# Patient Record
Sex: FEMALE | URBAN - METROPOLITAN AREA
[De-identification: names, ages, dates, MRNs, and addresses within clinical notes are randomized per-mention and may not be internally consistent; named-entity substitution may affect disease eponyms.]

---

## 2021-08-07 ENCOUNTER — APPOINTMENT (RX ONLY)
Dept: URBAN - METROPOLITAN AREA CLINIC 55 | Facility: CLINIC | Age: 31
Setting detail: DERMATOLOGY
End: 2021-08-07

## 2021-08-07 DIAGNOSIS — Z41.9 ENCOUNTER FOR PROCEDURE FOR PURPOSES OTHER THAN REMEDYING HEALTH STATE, UNSPECIFIED: ICD-10-CM

## 2021-08-07 PROCEDURE — ? DYSPORT

## 2021-08-07 PROCEDURE — ? COSMETIC CONSULTATION: GENERAL

## 2021-08-07 NOTE — PROCEDURE: DYSPORT
Left Periorbital Skin Units: 0
Consent: Written consent obtained. Risks include but not limited to lid/brow ptosis, bruising, swelling, diplopia, temporary effect, incomplete chemical denervation.
Glabellar Complex Units: 45
Show Additional Area 1: Yes
Expiration Date (Month Year): 2/28/2022
Show Mentalis Units: No
Additional Area 1 Location: Right Lateral Brow
Additional Area 3 Location: Chin
Additional Area 2 Location: Upper Lip
Dilution (U/0.1 Cc): 10
Lot #: [id number]
Detail Level: Detailed
Forehead Units: 12.5
Post-Care Instructions: Patient instructed to not lie down for 4 hours and limit physical activity for 24 hours.

## 2022-03-05 ENCOUNTER — APPOINTMENT (RX ONLY)
Dept: URBAN - METROPOLITAN AREA CLINIC 55 | Facility: CLINIC | Age: 32
Setting detail: DERMATOLOGY
End: 2022-03-05

## 2022-03-05 DIAGNOSIS — Z41.9 ENCOUNTER FOR PROCEDURE FOR PURPOSES OTHER THAN REMEDYING HEALTH STATE, UNSPECIFIED: ICD-10-CM

## 2022-03-05 PROCEDURE — ? DYSPORT

## 2022-03-05 NOTE — PROCEDURE: DYSPORT
R Brow Units: 0
Additional Area 2 Location: Upper lip
Consent: Written consent obtained. Risks include but not limited to lid/brow ptosis, bruising, swelling, diplopia, temporary effect, incomplete chemical denervation.
Show Orbicularis Oculi Units: Yes
Show Right And Left Periorbital Units: No
Glabellar Complex Units: 45
Additional Area 4 Location: left lateral 
Dilution (U/0.1 Cc): 10
Lot #: [id number]
Forehead Units: 12.5
Additional Area 1 Location: Lateral Brows
Additional Area 5 Location: right lateral brow
Expiration Date (Month Year): 7/31/22
Additional Area 3 Location: Chin
Detail Level: Detailed
Post-Care Instructions: Patient instructed to not lie down for 4 hours and limit physical activity for 24 hours.

## 2023-04-12 ENCOUNTER — APPOINTMENT (RX ONLY)
Dept: URBAN - METROPOLITAN AREA CLINIC 55 | Facility: CLINIC | Age: 33
Setting detail: DERMATOLOGY
End: 2023-04-12

## 2023-04-12 DIAGNOSIS — Z41.9 ENCOUNTER FOR PROCEDURE FOR PURPOSES OTHER THAN REMEDYING HEALTH STATE, UNSPECIFIED: ICD-10-CM

## 2023-04-12 PROCEDURE — ? DYSPORT

## 2023-04-12 NOTE — PROCEDURE: DYSPORT
Nasal Root Units: 0
Show Right And Left Periorbital Units: No
Show Masseter Units: Yes
Consent: Verbal consent obtained. Risks include but not limited to lid/brow ptosis, bruising, swelling, diplopia, temporary effect, incomplete chemical denervation.
Detail Level: Detailed
Post-Care Instructions: Patient instructed to not lie down for 4 hours and limit physical activity for 24 hours.
Glabellar Complex Units: 45
Additional Area 2 Location: chin
Show Inventory Tab: Show
Dilution (U/0.1 Cc): 10
Additional Area 1 Location: Upper lip
Forehead Units: 12.5

## 2023-09-18 ENCOUNTER — APPOINTMENT (RX ONLY)
Dept: URBAN - METROPOLITAN AREA CLINIC 55 | Facility: CLINIC | Age: 33
Setting detail: DERMATOLOGY
End: 2023-09-18

## 2023-09-18 DIAGNOSIS — Z41.9 ENCOUNTER FOR PROCEDURE FOR PURPOSES OTHER THAN REMEDYING HEALTH STATE, UNSPECIFIED: ICD-10-CM

## 2023-09-18 PROCEDURE — ? COSMETIC CONSULTATION: GENERAL

## 2023-09-18 PROCEDURE — ? INVENTORY

## 2023-10-09 ENCOUNTER — APPOINTMENT (RX ONLY)
Dept: URBAN - METROPOLITAN AREA CLINIC 55 | Facility: CLINIC | Age: 33
Setting detail: DERMATOLOGY
End: 2023-10-09

## 2023-10-09 DIAGNOSIS — Z41.9 ENCOUNTER FOR PROCEDURE FOR PURPOSES OTHER THAN REMEDYING HEALTH STATE, UNSPECIFIED: ICD-10-CM

## 2023-10-09 PROCEDURE — ? INVENTORY

## 2023-10-09 PROCEDURE — ? COSMETIC CONSULTATION: GENERAL

## 2023-10-09 PROCEDURE — ? DYSPORT

## 2023-10-09 NOTE — PROCEDURE: DYSPORT
Additional Area 2 Location: chin
L Brow Units: 0
Show Depressor Anguli Units: Yes
Show Inventory Tab: Show
Show Ucl Units: No
Periorbital Skin Units: 20
Consent: Verbal consent obtained. Risks include but not limited to lid/brow ptosis, bruising, swelling, diplopia, temporary effect, incomplete chemical denervation.
Post-Care Instructions: Patient instructed to not lie down for 4 hours and limit physical activity for 24 hours.
Dilution (U/0.1 Cc): 10
Forehead Units: 12.5
Glabellar Complex Units: 45
Detail Level: Detailed
Additional Area 1 Location: Upper lip

## 2023-10-16 ENCOUNTER — APPOINTMENT (RX ONLY)
Dept: URBAN - METROPOLITAN AREA CLINIC 55 | Facility: CLINIC | Age: 33
Setting detail: DERMATOLOGY
End: 2023-10-16

## 2023-10-16 DIAGNOSIS — Z41.9 ENCOUNTER FOR PROCEDURE FOR PURPOSES OTHER THAN REMEDYING HEALTH STATE, UNSPECIFIED: ICD-10-CM

## 2023-10-16 PROCEDURE — ? PLATINUM HYDRAFACIAL

## 2023-10-16 PROCEDURE — ? INVENTORY

## 2023-10-16 ASSESSMENT — LOCATION DETAILED DESCRIPTION DERM: LOCATION DETAILED: GLABELLA

## 2023-10-16 ASSESSMENT — LOCATION ZONE DERM: LOCATION ZONE: FACE

## 2023-10-16 ASSESSMENT — LOCATION SIMPLE DESCRIPTION DERM: LOCATION SIMPLE: GLABELLA

## 2023-10-27 ENCOUNTER — APPOINTMENT (RX ONLY)
Dept: URBAN - METROPOLITAN AREA CLINIC 55 | Facility: CLINIC | Age: 33
Setting detail: DERMATOLOGY
End: 2023-10-27

## 2023-10-27 DIAGNOSIS — Z41.9 ENCOUNTER FOR PROCEDURE FOR PURPOSES OTHER THAN REMEDYING HEALTH STATE, UNSPECIFIED: ICD-10-CM

## 2023-10-27 PROCEDURE — ? FILLERS

## 2023-10-27 PROCEDURE — ? INVENTORY

## 2023-10-27 NOTE — PROCEDURE: FILLERS
Jawline Filler Volume In Cc: 0
Anesthesia Type: 1% lidocaine with epinephrine
Include Cannula Information In Note?: Yes
Anesthesia Volume In Cc: 0.5
Additional Anesthesia Volume In Cc: 6
Consent: Written consent obtained. Risks include but not limited to bruising, beading, irregular texture, ulceration, infection, allergic reaction, scar formation, incomplete augmentation, temporary nature, and procedural pain.
Include Cannula Size?: 27G
Detail Level: Detailed
Post-Care Instructions: After the procedure, patient instructed to apply ice to reduce swelling.
Include Cannula Information In Note?: No
Filler: RHA 3
Map Statment: See Attach Map for Complete Details

## 2024-03-25 ENCOUNTER — APPOINTMENT (RX ONLY)
Dept: URBAN - METROPOLITAN AREA CLINIC 55 | Facility: CLINIC | Age: 34
Setting detail: DERMATOLOGY
End: 2024-03-25

## 2024-03-25 DIAGNOSIS — Z41.9 ENCOUNTER FOR PROCEDURE FOR PURPOSES OTHER THAN REMEDYING HEALTH STATE, UNSPECIFIED: ICD-10-CM

## 2024-03-25 PROCEDURE — ? DYSPORT

## 2024-03-25 NOTE — PROCEDURE: DYSPORT
Additional Area 6 Units: 0
Show Additional Area 6: Yes
Show Ucl Units: No
Detail Level: Detailed
Consent: Verbal consent obtained. Risks include but not limited to lid/brow ptosis, bruising, swelling, diplopia, temporary effect, incomplete chemical denervation.
Additional Area 2 Location: chin
Show Inventory Tab: Show
Post-Care Instructions: Patient instructed to not lie down for 4 hours and limit physical activity for 24 hours.
Periorbital Skin Units: 20
Dilution (U/0.1 Cc): 10
Glabellar Complex Units: 45
Additional Area 1 Location: Upper lip
Forehead Units: 12.5

## 2024-04-19 ENCOUNTER — APPOINTMENT (RX ONLY)
Dept: URBAN - METROPOLITAN AREA CLINIC 55 | Facility: CLINIC | Age: 34
Setting detail: DERMATOLOGY
End: 2024-04-19

## 2024-04-19 DIAGNOSIS — Z41.9 ENCOUNTER FOR PROCEDURE FOR PURPOSES OTHER THAN REMEDYING HEALTH STATE, UNSPECIFIED: ICD-10-CM

## 2024-04-19 PROCEDURE — ? INVENTORY

## 2024-04-19 PROCEDURE — ? SIGNATURE HYDRAFACIAL

## 2024-04-19 ASSESSMENT — LOCATION DETAILED DESCRIPTION DERM: LOCATION DETAILED: GLABELLA

## 2024-04-19 ASSESSMENT — LOCATION ZONE DERM: LOCATION ZONE: FACE

## 2024-04-19 ASSESSMENT — LOCATION SIMPLE DESCRIPTION DERM: LOCATION SIMPLE: GLABELLA

## 2024-04-19 NOTE — PROCEDURE: SIGNATURE HYDRAFACIAL
Procedure: Fusion
Vacuum Pressure Low Setting (Will Not Render If Set To 0): 0
Tip: Hydropeel Tip, Clear
Solution: GlySal 7.5%
Procedure: Extraction
Treatment Number: 1
Procedure: Boost
Consent: Written consent obtained, risks reviewed including but not limited to crusting, scabbing, blistering, scarring, darker or lighter pigmentary change, bruising, and/or incomplete response.
Solution: Antiox-6
Tip: Hydropeel Tip, Blue
Indication: anti-aging
Post-Care Instructions: I reviewed with the patient in detail post-care instructions. Patient should stay away from the sun and wear sun protection until treated areas are fully healed.
Solution: Beta-HD
Tip Override
Procedure: Exfoliation
Solution Override
Tip: Hydropeel Tip, Teal
Procedure: Extend and Protect
Location: face
Solution: Activ-4
Procedure: Peel

## 2024-07-22 ENCOUNTER — APPOINTMENT (RX ONLY)
Dept: URBAN - METROPOLITAN AREA CLINIC 55 | Facility: CLINIC | Age: 34
Setting detail: DERMATOLOGY
End: 2024-07-22

## 2024-07-22 DIAGNOSIS — Z41.9 ENCOUNTER FOR PROCEDURE FOR PURPOSES OTHER THAN REMEDYING HEALTH STATE, UNSPECIFIED: ICD-10-CM

## 2024-07-22 PROCEDURE — ? DYSPORT

## 2024-07-22 PROCEDURE — ? FILLERS

## 2024-07-22 PROCEDURE — ? INVENTORY

## 2024-07-22 NOTE — PROCEDURE: DYSPORT
Additional Area 5 Units: 0
Additional Area 1 Location: Upper lip
Show Lateral Platysmal Band Units: Yes
Forehead Units: 12.5
Glabellar Complex Units: 45
Detail Level: Detailed
Show Ucl Units: No
Additional Area 2 Location: chin
Consent: Verbal consent obtained. Risks include but not limited to lid/brow ptosis, bruising, swelling, diplopia, temporary effect, incomplete chemical denervation.
Post-Care Instructions: Patient instructed to not lie down for 4 hours and limit physical activity for 24 hours.
Show Inventory Tab: Show
Dilution (U/0.1 Cc): 10
Periorbital Skin Units: 20

## 2024-07-22 NOTE — PROCEDURE: FILLERS
Decollete Filler Volume In Cc: 0
Map Statment: See Attach Map for Complete Details
Include Documentation That Aspiration Was Performed Prior To Injecting Filler:: No
Anesthesia Type: 1% lidocaine with epinephrine
Anesthesia Volume In Cc: 0.5
Consent: Verbal consent obtained, risks reviewed.
Post-Care Instructions: After the procedure, patient instructed to apply ice to reduce swelling.
Additional Anesthesia Volume In Cc: 6
Include Cannula Information In Note?: Yes
Detail Level: Detailed
Include Cannula Size?: 27G
Filler: RHA 2
Include Cannula Size?: 25G

## 2024-08-16 ENCOUNTER — APPOINTMENT (RX ONLY)
Dept: URBAN - METROPOLITAN AREA CLINIC 55 | Facility: CLINIC | Age: 34
Setting detail: DERMATOLOGY
End: 2024-08-16

## 2024-08-16 DIAGNOSIS — Z41.9 ENCOUNTER FOR PROCEDURE FOR PURPOSES OTHER THAN REMEDYING HEALTH STATE, UNSPECIFIED: ICD-10-CM

## 2024-08-16 PROCEDURE — ? INVENTORY

## 2024-08-16 PROCEDURE — ? HYDRAFACIAL

## 2024-08-16 ASSESSMENT — LOCATION SIMPLE DESCRIPTION DERM: LOCATION SIMPLE: LEFT FOREHEAD

## 2024-08-16 ASSESSMENT — LOCATION ZONE DERM: LOCATION ZONE: FACE

## 2024-08-16 ASSESSMENT — LOCATION DETAILED DESCRIPTION DERM: LOCATION DETAILED: LEFT INFERIOR MEDIAL FOREHEAD

## 2024-08-16 NOTE — PROCEDURE: HYDRAFACIAL
Vacuum Pressure Low Setting (Will Not Render If Set To 0): 0
Tip Override
Solution Override
Comments: Deluxe HF. Blue tip, peel prep, Alastin booster, 10 min red led
Indication: skin texture
Consent: Written consent obtained, risks reviewed including but not limited to crusting, scabbing, blistering, scarring, darker or lighter pigmentary change, bruising, and/or incomplete response.
Post-Care Instructions: I reviewed with the patient in detail post-care instructions. Patient should stay away from the sun and wear sun protection until treated areas are fully healed.
Location: face

## 2025-01-23 ENCOUNTER — APPOINTMENT (OUTPATIENT)
Dept: URBAN - METROPOLITAN AREA CLINIC 55 | Facility: CLINIC | Age: 35
Setting detail: DERMATOLOGY
End: 2025-01-23

## 2025-01-23 DIAGNOSIS — Z41.9 ENCOUNTER FOR PROCEDURE FOR PURPOSES OTHER THAN REMEDYING HEALTH STATE, UNSPECIFIED: ICD-10-CM

## 2025-01-23 PROCEDURE — ? DYSPORT

## 2025-01-23 PROCEDURE — ? DAXXIFY

## 2025-01-23 PROCEDURE — ? INVENTORY

## 2025-01-23 NOTE — PROCEDURE: DYSPORT
Show Forehead Units: Yes
Anterior Platysmal Bands Units: 0
Show Mentalis Units: No
Glabellar Complex Units: 45
Additional Area 1 Location: upper lip cutaneous
Detail Level: Detailed
Show Inventory Tab: Show
Additional Area 2 Location: chin
Consent obtained and risk reviewed.
Periorbital Skin Units: 20
Post-Care Instructions: Patient instructed to not lie down flat for 4 hours or rub the treatment area.
Dilution (U/0.1 Cc): 10

## 2025-01-23 NOTE — PROCEDURE: DAXXIFY
Additional Area 2 Location: lower lip
Show Anterior Platysmal Band Units: Yes
Depressor Anguli Oris Units: 0
Comments: Make up was removed from treatment area and area was prepper with 70% isopropyl alcohol.
Additional Area 1 Location: upper cutaneous lip
Detail Level: Detailed
Show Mentalis Units: No
Bill Summary Price Listed Below, Or Bill Total Of Units X Price Per Unit?: Bill Summary Price Below
Forehead Units: 16
Post-Care Instructions: Patient instructed to not lie down for 4 hours and limit physical activity for 24 hours.
Dilution (U/0.1 Cc): 8
Consent: Written consent and verbal obtained. Risk reviewed.
Show Inventory Tab: Show

## 2025-01-24 NOTE — PROCEDURE: PLATINUM HYDRAFACIAL
Orders:    Basic metabolic panel; Future    
Can continue with MiraLAX as needed       
Current regimen: Metoprolol succinate 25 mg daily, Xarelto 20 mg daily  Patient recently stopped due to recent onset of back pain/constipation.  Recommend patient restart with medications  Follows cardiology last appointment 2/2024, patient is to schedule follow-up       
Diet controlled, labs as below  Orders:    Lipid panel; Future    
Tip Override
Solution: Activ-4
Vacuum Pressure High Setting (Will Not Render If Set To 0): 0
Procedure: Fusion
Tip: Hydropeel Tip, Teal
Procedure: Extend and Protect
Solution Override
Treatment Number: 1
Consent: Written consent obtained, risks reviewed including but not limited to crusting, scabbing, blistering, scarring, darker or lighter pigmentary change, bruising, and/or incomplete response.
Procedure: Peel
Tip: Hydropeel Tip, Blue
Procedure: Extraction
Indication: acne
Tip: Hydropeel Tip, Clear
Solution: GlySal 15%
Post-Care Instructions: I reviewed with the patient in detail post-care instructions. Patient should stay away from the sun and wear sun protection until treated areas are fully healed.
Procedure: Exfoliation
Procedure: Boost
Solution: Beta-HD
Location: face

## 2025-06-23 ENCOUNTER — APPOINTMENT (OUTPATIENT)
Dept: URBAN - METROPOLITAN AREA CLINIC 55 | Facility: CLINIC | Age: 35
Setting detail: DERMATOLOGY
End: 2025-06-23

## 2025-06-23 DIAGNOSIS — Z41.9 ENCOUNTER FOR PROCEDURE FOR PURPOSES OTHER THAN REMEDYING HEALTH STATE, UNSPECIFIED: ICD-10-CM

## 2025-06-23 PROCEDURE — ? DYSPORT

## 2025-06-23 PROCEDURE — ? DAXXIFY

## 2025-06-23 PROCEDURE — ? INVENTORY

## 2025-06-23 NOTE — PROCEDURE: DAXXIFY
[facility] Units: 0
Show Additional Area 5: Yes
Show Mentalis Units: No
Comments: Make up was removed from treatment area and area was prepper with 70% isopropyl alcohol.
Forehead Units: 16
Post-Care Instructions: Patient instructed to not lie down for 4 hours and limit physical activity for 24 hours.
Consent: Written consent and verbal obtained. Risk reviewed.
Additional Area 1 Location: upper cutaneous lip
Detail Level: Detailed
Additional Area 2 Location: lower lip
Show Inventory Tab: Show
Bill Summary Price Listed Below, Or Bill Total Of Units X Price Per Unit?: Bill Summary Price Below
Dilution (U/0.1 Cc): 8

## 2025-06-23 NOTE — PROCEDURE: DYSPORT
Show Right And Left Brow Units: No
Additional Area 5 Units: 0
Show Masseter Units: Yes
Additional Area 1 Location: upper lip cutaneous
Additional Area 2 Location: chin
Show Inventory Tab: Show
Periorbital Skin Units: 20
Consent obtained and risk reviewed.
Dilution (U/0.1 Cc): 10
Post-Care Instructions: Patient instructed to not lie down flat for 4 hours or rub the treatment area.
Detail Level: Detailed
Glabellar Complex Units: 45

## 2025-07-14 ENCOUNTER — APPOINTMENT (OUTPATIENT)
Dept: URBAN - METROPOLITAN AREA CLINIC 55 | Facility: CLINIC | Age: 35
Setting detail: DERMATOLOGY
End: 2025-07-14

## 2025-07-14 DIAGNOSIS — Z41.9 ENCOUNTER FOR PROCEDURE FOR PURPOSES OTHER THAN REMEDYING HEALTH STATE, UNSPECIFIED: ICD-10-CM

## 2025-07-14 PROCEDURE — ? INVENTORY

## 2025-07-31 ENCOUNTER — APPOINTMENT (OUTPATIENT)
Dept: URBAN - METROPOLITAN AREA CLINIC 55 | Facility: CLINIC | Age: 35
Setting detail: DERMATOLOGY
End: 2025-07-31

## 2025-07-31 DIAGNOSIS — Z41.9 ENCOUNTER FOR PROCEDURE FOR PURPOSES OTHER THAN REMEDYING HEALTH STATE, UNSPECIFIED: ICD-10-CM

## 2025-07-31 PROCEDURE — ? DELUXE HYDRAFACIAL

## 2025-07-31 PROCEDURE — ? DERMAPLANE

## 2025-07-31 PROCEDURE — ? INVENTORY

## 2025-07-31 ASSESSMENT — LOCATION SIMPLE DESCRIPTION DERM: LOCATION SIMPLE: GLABELLA

## 2025-07-31 ASSESSMENT — LOCATION ZONE DERM: LOCATION ZONE: FACE

## 2025-07-31 ASSESSMENT — LOCATION DETAILED DESCRIPTION DERM: LOCATION DETAILED: GLABELLA

## 2025-07-31 NOTE — PROCEDURE: DERMAPLANE
Post-Procedure Instructions: Following the dermaplane procedure, washed off with cool washcloth. Moisturizer and SPF was applied.
Blade: 10R blade scalpel
Post-Care Instructions: I reviewed with the patient in detail post-care instructions.
Pre-Procedure Text: The patient was placed in a recumbant position on the procedure table.
Detail Level: Zone
Treatment Areas: face
Treatment Area Prep: acetone

## 2025-07-31 NOTE — PROCEDURE: DELUXE HYDRAFACIAL
Tip Override
Solution Override
Vacuum Pressure High Setting (Will Not Render If Set To 0): 0
Treatment Number: 1
Indication: anti-aging
Solution: GlySal 15%
Tip: Hydropeel Tip, Clear
Tip: Hydropeel Tip, Teal
Procedure: Extraction
Procedure: Fusion
Tip: Hydropeel Tip, Blue
Procedure: Extend and Protect
Procedure: Exfoliation
Location: face
Solution: Activ-4
Solution: Beta-HD
Consent: Written consent obtained, risks reviewed including but not limited to crusting, scabbing, blistering, scarring, darker or lighter pigmentary change, bruising, and/or incomplete response.
Procedure: Peel
Procedure: Boost
Post-Care Instructions: I reviewed with the patient in detail post-care instructions. Patient should stay away from the sun and wear sun protection until treated areas are fully healed.